# Patient Record
Sex: FEMALE | Race: WHITE | NOT HISPANIC OR LATINO | Employment: OTHER | URBAN - METROPOLITAN AREA
[De-identification: names, ages, dates, MRNs, and addresses within clinical notes are randomized per-mention and may not be internally consistent; named-entity substitution may affect disease eponyms.]

---

## 2017-09-21 ENCOUNTER — PATIENT OUTREACH (OUTPATIENT)
Dept: ADMINISTRATIVE | Facility: HOSPITAL | Age: 82
End: 2017-09-21

## 2017-09-21 NOTE — LETTER
September 21, 2017    Shivani Gilliland  [address] [place] [address]             [facility]  [address]  [place] [address]  Phone: [phone] Dear Ms. Gilliland:    [facility] is committed to your overall health and would like to ensure that you are up to date on all of your health maintenance testing.  Your insurance company ([email or web address]) has informed us that you have Dr. Vences listed as your primary care provider.    If Dr. Vences is not your primary care provider, please contact your insurance company, [email or web address] and have them update their records with the correct provider.     Otherwise,  Please schedule an appointment with Dr. Vences by calling [phone] at your earliest convenience.    You may also be due for Osteoporosis screening (DEXA SCAN), Influenza vaccine, Tetanus immunization, Shingles immunization, and Pneumonia immunization.    If you have any questions or concerns, please don't hesitate to call.    Sincerely,    Winter Cat  Clinical Care Coordinator  [facility] [address] [place] [address]  Phone: [phone]   Fax: [phone]

## 2017-09-21 NOTE — LETTER
September 21, 2017    Shivani Gilliland  [address] [facility] [address] [place] [address]             [facility]  [address]  [facility] [address]  Phone: [phone] Dear Ms. Gilliland:    [facility] is committed to your overall health.  To help you get the most out of each of your visits, we will review your information to make sure you are up to date on all of your recommended tests and/or procedures.      Dr. Vences       has found that you may be due for:    Osteoporosis screening (DEXA SCAN)  Shingles immunization  Tetanus immunization  Pneumonia immunization        If you have had any of the above done at another facility, please bring the records or information with you so that your record at [facility] will be complete.     If you are currently taking medication, please bring it with you to your appointment for review.    .          If you have any questions or concerns, please don't hesitate to call.    Sincerely,        Winter Cat LPN

## 2019-10-02 NOTE — PROGRESS NOTES
Clifford over 12 month report.   Dr. Vences listed as PCP.  Needs to establish care.  Letter mailed.       5

## 2019-11-29 ENCOUNTER — PES CALL (OUTPATIENT)
Dept: ADMINISTRATIVE | Facility: CLINIC | Age: 84
End: 2019-11-29

## 2020-02-18 ENCOUNTER — OFFICE VISIT (OUTPATIENT)
Dept: FAMILY MEDICINE | Facility: CLINIC | Age: 85
End: 2020-02-18
Payer: MEDICARE

## 2020-02-18 VITALS
RESPIRATION RATE: 18 BRPM | OXYGEN SATURATION: 99 % | DIASTOLIC BLOOD PRESSURE: 84 MMHG | SYSTOLIC BLOOD PRESSURE: 142 MMHG | BODY MASS INDEX: 26.93 KG/M2 | HEART RATE: 91 BPM | WEIGHT: 177.69 LBS | HEIGHT: 68 IN

## 2020-02-18 DIAGNOSIS — R22.1 LOCALIZED SWELLING, MASS AND LUMP, NECK: Primary | ICD-10-CM

## 2020-02-18 DIAGNOSIS — I10 ESSENTIAL HYPERTENSION: ICD-10-CM

## 2020-02-18 DIAGNOSIS — I48.11 LONGSTANDING PERSISTENT ATRIAL FIBRILLATION: ICD-10-CM

## 2020-02-18 DIAGNOSIS — E78.5 HYPERLIPIDEMIA, UNSPECIFIED HYPERLIPIDEMIA TYPE: ICD-10-CM

## 2020-02-18 DIAGNOSIS — Z78.0 ASYMPTOMATIC POSTMENOPAUSAL STATE: ICD-10-CM

## 2020-02-18 DIAGNOSIS — M54.50 CHRONIC BILATERAL LOW BACK PAIN WITHOUT SCIATICA: ICD-10-CM

## 2020-02-18 DIAGNOSIS — G89.29 CHRONIC BILATERAL LOW BACK PAIN WITHOUT SCIATICA: ICD-10-CM

## 2020-02-18 PROCEDURE — 99999 PR PBB SHADOW E&M-EST. PATIENT-LVL IV: CPT | Mod: PBBFAC,,, | Performed by: INTERNAL MEDICINE

## 2020-02-18 PROCEDURE — 3077F PR MOST RECENT SYSTOLIC BLOOD PRESSURE >= 140 MM HG: ICD-10-PCS | Mod: S$GLB,,, | Performed by: INTERNAL MEDICINE

## 2020-02-18 PROCEDURE — 1159F PR MEDICATION LIST DOCUMENTED IN MEDICAL RECORD: ICD-10-PCS | Mod: S$GLB,,, | Performed by: INTERNAL MEDICINE

## 2020-02-18 PROCEDURE — 1125F PR PAIN SEVERITY QUANTIFIED, PAIN PRESENT: ICD-10-PCS | Mod: S$GLB,,, | Performed by: INTERNAL MEDICINE

## 2020-02-18 PROCEDURE — 3077F SYST BP >= 140 MM HG: CPT | Mod: S$GLB,,, | Performed by: INTERNAL MEDICINE

## 2020-02-18 PROCEDURE — 99204 PR OFFICE/OUTPT VISIT, NEW, LEVL IV, 45-59 MIN: ICD-10-PCS | Mod: S$GLB,,, | Performed by: INTERNAL MEDICINE

## 2020-02-18 PROCEDURE — 1125F AMNT PAIN NOTED PAIN PRSNT: CPT | Mod: S$GLB,,, | Performed by: INTERNAL MEDICINE

## 2020-02-18 PROCEDURE — 1101F PT FALLS ASSESS-DOCD LE1/YR: CPT | Mod: S$GLB,,, | Performed by: INTERNAL MEDICINE

## 2020-02-18 PROCEDURE — 3079F DIAST BP 80-89 MM HG: CPT | Mod: S$GLB,,, | Performed by: INTERNAL MEDICINE

## 2020-02-18 PROCEDURE — 1159F MED LIST DOCD IN RCRD: CPT | Mod: S$GLB,,, | Performed by: INTERNAL MEDICINE

## 2020-02-18 PROCEDURE — 1101F PR PT FALLS ASSESS DOC 0-1 FALLS W/OUT INJ PAST YR: ICD-10-PCS | Mod: S$GLB,,, | Performed by: INTERNAL MEDICINE

## 2020-02-18 PROCEDURE — 3079F PR MOST RECENT DIASTOLIC BLOOD PRESSURE 80-89 MM HG: ICD-10-PCS | Mod: S$GLB,,, | Performed by: INTERNAL MEDICINE

## 2020-02-18 PROCEDURE — 99999 PR PBB SHADOW E&M-EST. PATIENT-LVL IV: ICD-10-PCS | Mod: PBBFAC,,, | Performed by: INTERNAL MEDICINE

## 2020-02-18 PROCEDURE — 99204 OFFICE O/P NEW MOD 45 MIN: CPT | Mod: S$GLB,,, | Performed by: INTERNAL MEDICINE

## 2020-02-18 RX ORDER — FUROSEMIDE 20 MG/1
TABLET ORAL
COMMUNITY
Start: 2020-01-20

## 2020-02-18 RX ORDER — TIZANIDINE HYDROCHLORIDE 2 MG/1
2 CAPSULE, GELATIN COATED ORAL NIGHTLY PRN
Qty: 20 CAPSULE | Refills: 0 | Status: SHIPPED | OUTPATIENT
Start: 2020-02-18 | End: 2020-02-28

## 2020-02-18 NOTE — PROGRESS NOTES
Assessment and Plan:    L cervical lymphadenopathy  Suspect it may be reactive lymphadenopathy 2/2 illness in December, however it has not resolved or reportedly decreased in size since that time.  Will order ultrasound L neck today and continue to follow.  Low threshold to order biopsy if any abnormalities seen on imaging or lymphadenopathy does not resolve over next 4 weeks.     ______________________________________________________________________  Subjective:    Chief Complaint:   L neck swelling    HPI:  Shivani is a 84 y.o. year old female presents here with L neck swelling that is nonpainful and unchanged in size since December 2019.  She first became aware of swelling after an urgent care visit for a persistent cough in December 2019 and they noted swelling in her neck and fluid behind TM.  She was given 10 day corticosteroid taper.  Denies abnormal bleeding or bruising, fever, night sweats or recent change in weight or appetite.     Medications:  Current Outpatient Medications on File Prior to Visit   Medication Sig Dispense Refill    atorvastatin (LIPITOR) 20 MG tablet TAKE 1 TABLET ONE TIME DAILY (Patient taking differently: Take 20 mg by mouth every other day. ) 90 tablet PRN    furosemide (LASIX) 20 MG tablet TK 1 T PO 2 TO 3 TIMES A WK      losartan-hydrochlorothiazide 100-25 mg (HYZAAR) 100-25 mg per tablet TAKE 1 TABLET DAILY 90 tablet PRN    metoprolol tartrate (LOPRESSOR) 50 MG tablet Half Tablet Oral twice a day 90 tablet 3    XARELTO 20 mg Tab TAKE ONE TABLET BY MOUTH ONCE DAILY 30 tablet PRN    zolpidem (AMBIEN) 10 mg Tab TAKE 1 TABLET BY MOUTH AT BEDTIME AS NEEDED FOR SLEEP  30 tablet 2    nabumetone (RELAFEN) 750 MG tablet TAKE ONE TABLET BY MOUTH TWICE DAILY (Patient not taking: Reported on 2/18/2020) 60 tablet 0    [DISCONTINUED] LYRICA 50 mg capsule TAKE ONE CAPSULE BY MOUTH THREE TIMES DAILY (Patient not taking: Reported on 2/18/2020) 90 capsule 2     No current  "facility-administered medications on file prior to visit.        Review of Systems:  Review of Systems   Constitutional: Negative for activity change, chills, fatigue and fever.   HENT: Negative for congestion, sinus pressure and sinus pain.    Respiratory: Negative for cough, choking, chest tightness and shortness of breath.    Gastrointestinal: Negative for abdominal distention, abdominal pain, nausea and vomiting.   Musculoskeletal: Negative for arthralgias, neck pain and neck stiffness.   Skin: Negative for color change, pallor and rash.   Hematological: Positive for adenopathy (L neck swelling). Does not bruise/bleed easily.       Past Medical History:  Past Medical History:   Diagnosis Date    HBP (high blood pressure)     Shingles        Objective:    Vitals:  Vitals:    02/18/20 1511   BP: (!) 154/98   Pulse: 91   Resp: 18   SpO2: 99%   Weight: 80.6 kg (177 lb 11.1 oz)   Height: 5' 7.5" (1.715 m)   PainSc:   8   PainLoc: Back       Physical Exam   Constitutional: She appears well-developed and well-nourished.   HENT:   Head: Normocephalic and atraumatic.   Neck: Normal range of motion. Neck supple. No thyromegaly present.   Lymphadenopathy:     She has cervical adenopathy (L sided lymphadenopathy, mobile and round).   Skin: Skin is warm and dry. No rash noted. No erythema. No pallor.   Psychiatric: She has a normal mood and affect. Her behavior is normal.       Wanda Simon, MS4    "

## 2020-02-18 NOTE — PROGRESS NOTES
Assessment and Plan:    1. Localized swelling, mass and lump, neck  Likely LN palpable on left side of neck, but larger than would be expected for reactive LN and has been present for longer. Will obtain US.  - US Soft Tissue Head Neck Thyroid; Future    2. Chronic bilateral low back pain without sciatica  Discussed PT as mainstay of treatment, she is open to this but can not remember the name of the PT she wants to see. Will call back with the name, then can send referral.   - tiZANidine 2 mg Cap; Take 1 capsule (2 mg total) by mouth nightly as needed.  Dispense: 20 capsule; Refill: 0    3. Essential hypertension  Continue follow up with Dr. Valle. Clinic notes and labs requested. Discussed that if BP consistently >140 systolic, we may want to talk about increasing doses, but she would only like to discuss this with her cardiologist.     4. Hyperlipidemia, unspecified hyperlipidemia type  Continue follow up with Dr. Valle. Clinic notes and labs requested.     5. Longstanding persistent atrial fibrillation  Continue follow up with Dr. Valle. Clinic notes and labs requested.     6. Asymptomatic postmenopausal state  - DXA Bone Density Spine And Hip; Future      ______________________________________________________________________  Subjective:    Chief Complaint:  Establish care    HPI:  Shivani is a 84 y.o. year old woman here to establish care.     She sees Dr. Valle for HTN, HLD, and A-fib. Takes losartan 100, metoprolol 25, HCTZ 25, and furosemide 20 QOD for HTN and rate control    She reports that she has been having pain in her low back for several months and would like to know what to do about this. Has been taking tylenol which has been only partially helpful.     She takes ambien for insomnia prescribed by Dr. Valle, she is aware that I do not prescribe this and do not recommend this medication.    She also reports that she has had an area of swelling on the left side of her neck for about 2 months. Initially  noticed this around the time that she had an URI and fluid in the left ear. Other symptoms gone, but the swelling remains. She denies any unusual fatigue, night sweats, weight loss, etc.     Past Medical History:  Past Medical History:   Diagnosis Date    A-fib     HBP (high blood pressure)     HLD (hyperlipidemia)     Shingles        Past Surgical History:  Past Surgical History:   Procedure Laterality Date    CATARACT EXTRACTION, BILATERAL         Family History:  Family History   Problem Relation Age of Onset    Coronary artery disease Father     Cancer Mother         lung       Social History:  Social History     Socioeconomic History    Marital status:      Spouse name: Not on file    Number of children: Not on file    Years of education: Not on file    Highest education level: Not on file   Occupational History    Not on file   Social Needs    Financial resource strain: Not on file    Food insecurity:     Worry: Not on file     Inability: Not on file    Transportation needs:     Medical: Not on file     Non-medical: Not on file   Tobacco Use    Smoking status: Former Smoker    Smokeless tobacco: Never Used   Substance and Sexual Activity    Alcohol use: Not on file    Drug use: Not on file    Sexual activity: Not on file   Lifestyle    Physical activity:     Days per week: Not on file     Minutes per session: Not on file    Stress: Not on file   Relationships    Social connections:     Talks on phone: Not on file     Gets together: Not on file     Attends Buddhism service: Not on file     Active member of club or organization: Not on file     Attends meetings of clubs or organizations: Not on file     Relationship status: Not on file   Other Topics Concern    Not on file   Social History Narrative    Not on file       Medications:  Current Outpatient Medications on File Prior to Visit   Medication Sig Dispense Refill    atorvastatin (LIPITOR) 20 MG tablet TAKE 1 TABLET ONE  TIME DAILY (Patient taking differently: Take 20 mg by mouth every other day. ) 90 tablet PRN    furosemide (LASIX) 20 MG tablet TK 1 T PO 2 TO 3 TIMES A WK      losartan-hydrochlorothiazide 100-25 mg (HYZAAR) 100-25 mg per tablet TAKE 1 TABLET DAILY 90 tablet PRN    metoprolol tartrate (LOPRESSOR) 50 MG tablet Half Tablet Oral twice a day 90 tablet 3    XARELTO 20 mg Tab TAKE ONE TABLET BY MOUTH ONCE DAILY 30 tablet PRN    zolpidem (AMBIEN) 10 mg Tab TAKE 1 TABLET BY MOUTH AT BEDTIME AS NEEDED FOR SLEEP  30 tablet 2    nabumetone (RELAFEN) 750 MG tablet TAKE ONE TABLET BY MOUTH TWICE DAILY (Patient not taking: Reported on 2/18/2020) 60 tablet 0    [DISCONTINUED] LYRICA 50 mg capsule TAKE ONE CAPSULE BY MOUTH THREE TIMES DAILY (Patient not taking: Reported on 2/18/2020) 90 capsule 2     No current facility-administered medications on file prior to visit.        Allergies:  Aspirin    Immunizations:  Immunization History   Administered Date(s) Administered    Influenza - High Dose - PF (65 years and older) 09/06/2013, 09/06/2013, 09/29/2017, 09/30/2018    Pneumococcal Conjugate - 13 Valent 09/30/2018    Tdap 09/29/2017       Review of Systems:  Review of Systems   Constitutional: Negative for chills, fatigue, fever and unexpected weight change.   HENT: Negative for congestion and trouble swallowing.    Eyes: Negative for pain and visual disturbance.   Respiratory: Negative for shortness of breath and wheezing.    Cardiovascular: Negative for chest pain, palpitations and leg swelling.   Gastrointestinal: Negative for nausea and vomiting.   Endocrine: Negative for polydipsia and polyuria.   Genitourinary: Negative for difficulty urinating and frequency.   Musculoskeletal: Positive for back pain. Negative for myalgias.   Skin: Negative for rash and wound.   Neurological: Negative for dizziness and syncope.   Hematological: Positive for adenopathy. Does not bruise/bleed easily.   Psychiatric/Behavioral: Negative  "for dysphoric mood. The patient is not nervous/anxious.        Objective:    Vitals:  Vitals:    02/18/20 1511 02/18/20 1557   BP: (!) 154/98 (!) 142/84   Pulse: 91    Resp: 18    SpO2: 99%    Weight: 80.6 kg (177 lb 11.1 oz)    Height: 5' 7.5" (1.715 m)    PainSc:   8    PainLoc: Back        Physical Exam   Constitutional: She is oriented to person, place, and time. She appears well-developed and well-nourished. No distress.   HENT:   Head:       Mouth/Throat: Oropharynx is clear and moist. No oropharyngeal exudate.   Eyes: Conjunctivae are normal. Right eye exhibits no discharge. Left eye exhibits no discharge. No scleral icterus.   Neck: Neck supple. No tracheal deviation present. No thyromegaly present.   Cardiovascular: Normal rate, normal heart sounds and intact distal pulses.   No murmur heard.  irregularly irregular rhythm with regular rate   Pulmonary/Chest: Effort normal and breath sounds normal. No respiratory distress. She has no wheezes. She has no rales.   Abdominal: Soft. Bowel sounds are normal. She exhibits no distension. There is no tenderness.   Musculoskeletal: She exhibits no edema.        Back:    good ROM of lumbar spine, some pain with rotation and lateral bending but flexion and extension are without pain, no spinous process tenderness, no pain with internal or external rotation of hips, negative straight leg raise bilaterally   Lymphadenopathy:     She has no cervical adenopathy.   Neurological: She is alert and oriented to person, place, and time.   Skin: Skin is warm and dry. She is not diaphoretic.   Psychiatric: She has a normal mood and affect. Her behavior is normal. Judgment normal.   Vitals reviewed.      Body mass index is 27.42 kg/m².      Data:  Previous labs reviewed and pertinent for last labs from 2013 with Cr 1.1.        Olga Willams MD  Internal Medicine    "

## 2020-02-19 ENCOUNTER — HOSPITAL ENCOUNTER (OUTPATIENT)
Dept: RADIOLOGY | Facility: HOSPITAL | Age: 85
Discharge: HOME | End: 2020-02-19
Attending: INTERNAL MEDICINE
Payer: MEDICARE

## 2020-02-19 DIAGNOSIS — R22.1 LOCALIZED SWELLING, MASS AND LUMP, NECK: ICD-10-CM

## 2020-02-19 DIAGNOSIS — Z78.0 ASYMPTOMATIC POSTMENOPAUSAL STATE: ICD-10-CM

## 2020-02-19 PROCEDURE — 76536 US EXAM OF HEAD AND NECK: CPT | Mod: TC,PO

## 2020-02-19 PROCEDURE — 77080 DEXA BONE DENSITY SPINE HIP: ICD-10-PCS | Mod: 26,,, | Performed by: RADIOLOGY

## 2020-02-19 PROCEDURE — 77080 DXA BONE DENSITY AXIAL: CPT | Mod: TC,PO

## 2020-02-19 PROCEDURE — 76536 US EXAM OF HEAD AND NECK: CPT | Mod: 26,,, | Performed by: RADIOLOGY

## 2020-02-19 PROCEDURE — 77080 DXA BONE DENSITY AXIAL: CPT | Mod: 26,,, | Performed by: RADIOLOGY

## 2020-02-19 PROCEDURE — 76536 US SOFT TISSUE HEAD NECK THYROID: ICD-10-PCS | Mod: 26,,, | Performed by: RADIOLOGY

## 2020-03-01 ENCOUNTER — TELEPHONE (OUTPATIENT)
Dept: FAMILY MEDICINE | Facility: CLINIC | Age: 85
End: 2020-03-01

## 2020-03-01 NOTE — TELEPHONE ENCOUNTER
Please call patient, I would like to set up an appointment at her convenience to discuss the results in more detail.     The lump in her neck appears to be a lymph node, but I would like to evaluate to make sure this is resolving as expected.     The ultrasound incidentally noted several thyroid nodules, and her bone density scan showed osteoporosis.     I would like to see her to discuss these results in more detail and discuss follow up from here.

## 2020-03-02 NOTE — TELEPHONE ENCOUNTER
----- Message from Noni Aguirre sent at 3/2/2020  4:28 PM CST -----  Contact: patient  Type:  Patient Returning Call    Who Called:  patient  Who Left Message for Patient:  Hayley  Does the patient know what this is regarding?:  Yes, tests that were run  Best Call Back Number:  [phone]  Additional Information:

## 2020-05-06 ENCOUNTER — PATIENT MESSAGE (OUTPATIENT)
Dept: ADMINISTRATIVE | Facility: HOSPITAL | Age: 85
End: 2020-05-06

## 2020-10-19 ENCOUNTER — TELEPHONE (OUTPATIENT)
Dept: FAMILY MEDICINE | Facility: CLINIC | Age: 85
End: 2020-10-19

## 2020-10-19 NOTE — TELEPHONE ENCOUNTER
----- Message from Ileana Lauren sent at 10/14/2020 10:56 AM CDT -----  Regarding: want to speak with manager  Type: Needs Medical Advice  Who Called:  Boo  Symptoms (please be specific):    How long has patient had these symptoms:    Pharmacy name and phone #:    Best Call Back Number: [phone] (home)     Additional Information:   upset because provider is not taking new pt, stated that his wife was seeing the provider but did not see provider with in three years. He states he would like to speak with mgmt. thanks

## 2020-10-20 ENCOUNTER — LAB VISIT (OUTPATIENT)
Dept: LAB | Facility: OTHER | Age: 85
End: 2020-10-20
Payer: MEDICARE

## 2020-10-20 DIAGNOSIS — Z11.59 SPECIAL SCREENING EXAMINATION FOR UNSPECIFIED VIRAL DISEASE: ICD-10-CM

## 2020-10-20 PROCEDURE — U0003 INFECTIOUS AGENT DETECTION BY NUCLEIC ACID (DNA OR RNA); SEVERE ACUTE RESPIRATORY SYNDROME CORONAVIRUS 2 (SARS-COV-2) (CORONAVIRUS DISEASE [COVID-19]), AMPLIFIED PROBE TECHNIQUE, MAKING USE OF HIGH THROUGHPUT TECHNOLOGIES AS DESCRIBED BY CMS-2020-01-R: HCPCS

## 2020-10-21 LAB — SARS-COV-2 RNA RESP QL NAA+PROBE: NOT DETECTED

## 2020-11-04 ENCOUNTER — DOCUMENT SCAN (OUTPATIENT)
Dept: HOME HEALTH SERVICES | Facility: HOSPITAL | Age: 85
End: 2020-11-04
Payer: MEDICARE

## 2020-11-05 PROBLEM — E78.5 HYPERLIPIDEMIA: Status: ACTIVE | Noted: 2020-11-05

## 2020-11-05 PROBLEM — I48.11 LONGSTANDING PERSISTENT ATRIAL FIBRILLATION: Status: ACTIVE | Noted: 2020-11-05

## 2020-11-05 PROBLEM — G47.00 INSOMNIA: Status: ACTIVE | Noted: 2020-11-05

## 2020-11-05 PROBLEM — S72.92XG: Status: ACTIVE | Noted: 2020-11-05

## 2020-11-05 PROBLEM — R60.0 EDEMA OF BOTH LOWER LEGS: Status: ACTIVE | Noted: 2020-11-05

## 2021-04-29 ENCOUNTER — PATIENT MESSAGE (OUTPATIENT)
Dept: RESEARCH | Facility: HOSPITAL | Age: 86
End: 2021-04-29
